# Patient Record
Sex: MALE | Race: WHITE
[De-identification: names, ages, dates, MRNs, and addresses within clinical notes are randomized per-mention and may not be internally consistent; named-entity substitution may affect disease eponyms.]

---

## 2019-04-27 ENCOUNTER — HOSPITAL ENCOUNTER (EMERGENCY)
Dept: HOSPITAL 80 - FED | Age: 24
Discharge: HOME | End: 2019-04-27
Payer: COMMERCIAL

## 2019-04-27 VITALS — SYSTOLIC BLOOD PRESSURE: 120 MMHG | DIASTOLIC BLOOD PRESSURE: 79 MMHG

## 2019-04-27 DIAGNOSIS — F07.81: ICD-10-CM
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
## 2019-04-27 DIAGNOSIS — R41.3: Primary | ICD-10-CM
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
